# Patient Record
Sex: FEMALE | Race: WHITE
[De-identification: names, ages, dates, MRNs, and addresses within clinical notes are randomized per-mention and may not be internally consistent; named-entity substitution may affect disease eponyms.]

---

## 2018-02-01 ENCOUNTER — HOSPITAL ENCOUNTER (INPATIENT)
Dept: HOSPITAL 96 - M.ERS | Age: 68
LOS: 2 days | Discharge: HOME HEALTH | DRG: 286 | End: 2018-02-03
Attending: INTERNAL MEDICINE | Admitting: INTERNAL MEDICINE
Payer: COMMERCIAL

## 2018-02-01 VITALS — HEIGHT: 59.02 IN | BODY MASS INDEX: 16.33 KG/M2 | WEIGHT: 81 LBS

## 2018-02-01 VITALS — DIASTOLIC BLOOD PRESSURE: 89 MMHG | SYSTOLIC BLOOD PRESSURE: 125 MMHG

## 2018-02-01 VITALS — DIASTOLIC BLOOD PRESSURE: 69 MMHG | SYSTOLIC BLOOD PRESSURE: 126 MMHG

## 2018-02-01 VITALS — DIASTOLIC BLOOD PRESSURE: 81 MMHG | SYSTOLIC BLOOD PRESSURE: 128 MMHG

## 2018-02-01 VITALS — DIASTOLIC BLOOD PRESSURE: 84 MMHG | SYSTOLIC BLOOD PRESSURE: 138 MMHG

## 2018-02-01 VITALS — DIASTOLIC BLOOD PRESSURE: 90 MMHG | SYSTOLIC BLOOD PRESSURE: 148 MMHG

## 2018-02-01 VITALS — DIASTOLIC BLOOD PRESSURE: 77 MMHG | SYSTOLIC BLOOD PRESSURE: 134 MMHG

## 2018-02-01 DIAGNOSIS — E87.2: ICD-10-CM

## 2018-02-01 DIAGNOSIS — I50.43: Primary | ICD-10-CM

## 2018-02-01 DIAGNOSIS — F17.210: ICD-10-CM

## 2018-02-01 DIAGNOSIS — M47.895: ICD-10-CM

## 2018-02-01 DIAGNOSIS — J96.01: ICD-10-CM

## 2018-02-01 DIAGNOSIS — R65.10: ICD-10-CM

## 2018-02-01 DIAGNOSIS — I25.10: ICD-10-CM

## 2018-02-01 DIAGNOSIS — M40.295: ICD-10-CM

## 2018-02-01 DIAGNOSIS — R59.9: ICD-10-CM

## 2018-02-01 DIAGNOSIS — I11.0: ICD-10-CM

## 2018-02-01 DIAGNOSIS — J44.1: ICD-10-CM

## 2018-02-01 DIAGNOSIS — E11.65: ICD-10-CM

## 2018-02-01 DIAGNOSIS — R64: ICD-10-CM

## 2018-02-01 DIAGNOSIS — J96.02: ICD-10-CM

## 2018-02-01 LAB
ABSOLUTE EOSINOPHILS: 0.1 THOU/UL (ref 0–0.7)
ALBUMIN SERPL-MCNC: 3 G/DL (ref 3.4–5)
ALP SERPL-CCNC: 88 U/L (ref 46–116)
ALT SERPL-CCNC: 54 U/L (ref 30–65)
ANION GAP SERPL CALC-SCNC: 12 MMOL/L (ref 7–16)
ANION GAP SERPL CALC-SCNC: 8 MMOL/L (ref 7–16)
APTT BLD: 24.2 SECONDS (ref 25–31.3)
AST SERPL-CCNC: 63 U/L (ref 15–37)
BE: -7.5 MMOL/L
BILIRUB SERPL-MCNC: 0.7 MG/DL
BUN SERPL-MCNC: 11 MG/DL (ref 7–18)
BUN SERPL-MCNC: 12 MG/DL (ref 7–18)
CALCIUM SERPL-MCNC: 7.8 MG/DL (ref 8.5–10.1)
CALCIUM SERPL-MCNC: 8.1 MG/DL (ref 8.5–10.1)
CHLORIDE SERPL-SCNC: 103 MMOL/L (ref 98–107)
CHLORIDE SERPL-SCNC: 108 MMOL/L (ref 98–107)
CO2 SERPL-SCNC: 26 MMOL/L (ref 21–32)
CO2 SERPL-SCNC: 26 MMOL/L (ref 21–32)
CREAT SERPL-MCNC: 0.8 MG/DL (ref 0.6–1.3)
CREAT SERPL-MCNC: 0.8 MG/DL (ref 0.6–1.3)
EOSINOPHIL NFR BLD: 1 %
GLUCOSE SERPL-MCNC: 165 MG/DL (ref 70–99)
GLUCOSE SERPL-MCNC: 240 MG/DL (ref 70–99)
GRANULOCYTES NFR BLD MANUAL: 91 %
HCO3 BLD-SCNC: 19.3 MMOL/L (ref 22–26)
HCT VFR BLD CALC: 45.7 % (ref 37–47)
HGB BLD-MCNC: 15.2 GM/DL (ref 12–15)
INR PPP: 1.1
LYMPHOCYTES # BLD: 0.8 THOU/UL (ref 0.8–5.3)
LYMPHOCYTES NFR BLD AUTO: 6 %
MCH RBC QN AUTO: 32.7 PG (ref 26–34)
MCHC RBC AUTO-ENTMCNC: 33.3 G/DL (ref 28–37)
MCV RBC: 98.3 FL (ref 80–100)
MPV: 8.3 FL. (ref 7.2–11.1)
NEUTROPHILS # BLD: 12.6 THOU/UL (ref 1.6–8.1)
NEUTS BAND NFR BLD: 2 %
NT-PRO BRAIN NAT PEPTIDE: 7240 PG/ML (ref ?–300)
NUCLEATED RBCS: 0 /100WBC
PCO2 BLD: 43.8 MMHG (ref 35–45)
PLATELET # BLD EST: ADEQUATE 10*3/UL
PLATELET COUNT*: 344 THOU/UL (ref 150–400)
PO2 BLD: 90.7 MMHG (ref 75–100)
POTASSIUM SERPL-SCNC: 3.3 MMOL/L (ref 3.5–5.1)
POTASSIUM SERPL-SCNC: 3.7 MMOL/L (ref 3.5–5.1)
PROT SERPL-MCNC: 5.6 G/DL (ref 6.4–8.2)
PROTHROMBIN TIME: 10.4 SECONDS (ref 9.2–11.5)
RBC # BLD AUTO: 4.65 MIL/UL (ref 4.2–5)
RBC MORPH BLD: NORMAL
RDW-CV: 12.8 % (ref 10.5–14.5)
SODIUM SERPL-SCNC: 141 MMOL/L (ref 136–145)
SODIUM SERPL-SCNC: 142 MMOL/L (ref 136–145)
TROPONIN-I LEVEL: 0.07 NG/ML (ref ?–0.06)
TROPONIN-I LEVEL: 0.5 NG/ML (ref ?–0.06)
WBC # BLD AUTO: 13.6 THOU/UL (ref 4–11)

## 2018-02-01 NOTE — EKG
[place] [address]
Phone:  [phone]                     ELECTROCARDIOGRAM REPORT      
_______________________________________________________________________________
 
Name:       CHANTELLE HAMILTONOKSANA GUTIERREZ                Room:           [address]    ADM [place]  
[facility]#:  [id number]      Account #:      [id number]  
Admission:  18     Attend Phys:    YASMIN Soto
Discharge:               Date of Birth:  [birth year]  
         Report #: [id number]
    [id number]
_______________________________________________________________________________
THIS REPORT FOR:  //name//                      
 
                         [facility] ED
                                       
Test Date:    2018               Test Time:    06:50:26
Pat Name:     NIGHAT HAMILTON            Department:   
Patient ID:   [id number]            Room:         [facility]
Gender:       F                        Technician:   TIFFANIE
:          1950               Requested By: Agusto Andrew
Order Number: [id number] MD:   Cecil Sanchez
                                 Measurements
Intervals                              Axis          
Rate:         122                      P:            0
PA:           105                      QRS:          -75
QRSD:         125                      T:            102
QT:           347                                    
QTc:          495                                    
                           Interpretive Statements
Sinus tachycardia
left axis
LBBB
Artifact in lead(s) I,III,aVR,aVL,aVF
No previous ECG available for comparison
 
Electronically Signed On 2018 14:44:27 CST by Cecil Sanchez
[email or web address]
 
 
 
 
 
 
 
 
 
 
 
 
 
 
 
 
 
  <ELECTRONICALLY SIGNED>
                                           By: Cecil Sanchez MD, [facility]      
  18     1444
D: 18 0650   _____________________________________
T: 18 0650   Cecil Sanchez MD, [facility]        /EPI

## 2018-02-01 NOTE — NUR
ASSUMED PT CARE FROM SARABJIT AT APPROX 1200. PT A/O X4, DENIES PAIN. PT SPOKE TO
GABE CARTER AND DR TATUM, PT TO HAVE A CARDIAC CATH TOMORROW. PT ANXIOUS,
QUESTIONS ANSWERED. PT EDUCATED ON THE PROCEDURE. PT USES CALL LIGHT
APPRORIALTY. REPORT GIVEN TO MARTÍNEZ RHODES

## 2018-02-01 NOTE — 2DMMODE
[place] [address]
Phone:  [phone] 2 D/M-MODE ECHOCARDIOGRAM     
_______________________________________________________________________________
 
Name:         NIGHAT HAMILTON               Room:          [address] [place] 
[facility]#:    [id number]     Account #:     [id number]  
Admission:    18    Attend Phys:   Giorgi Brown
Discharge:                Date of Birth: [birth year]  
Date of Service: 18 1645  Report #:      [id number]
        [id number]
_______________________________________________________________________________
THIS REPORT FOR:  //name//                      
 
 
--------------- APPROVED REPORT --------------
 
 
Study performed:  2018 14:50:34
 
EXAM: Comprehensive 2D, Doppler, and color-flow 
Echocardiogram 
Patient Location: In-Patient   
Room #:  201     Status:  routine
 
      BSA:         1.80
HR: 64 bpm BP:          125/89 mmHg 
Rhythm: NSR     
 
Other Information 
Technically limited study due to  subcostal views only, no rib space, 
89 lbs.
 
Indications
Dyspnea 
Elevated Troponin
 
2D Dimensions
   LVEF(%):  27.30 (&gt;50%)
IVSd:  6.59 (7-11mm) LVOT Diam:  17.14 (18-24mm) 
LVDd:  47.66 mm  
PWd:  8.28 (7-11mm) Ascending Ao:  30.45 (22-36mm)
LVDs:  41.62 (25-40mm) 
Aortic Root:  28.81 mm 
   Zheng's LVEF:  27.30 %
 
Aortic Valve
AoV Peak Timo.:  1.04 m/s 
AO Peak Gr.:  4.32 mmHg  LVOT Max P.96 mmHg
AO Mean Gr.:  2.57 mmHg  LVOT Mean P.41 mmHg
    LVOT Max V:  0.70 m/s
AO V2 VTI:  15.59 cm  LVOT Mean V:  0.58 m/s
AGUSTO (VTI):  2.19 cm2  LVOT V1 VTI:  14.77 cm
 
Mitral Valve
    E/A Ratio:  1.85
    MV Decel. Time:  122.32 ms
MV E Max Timo.:  0.73 m/s 
 
 
[place] [address]
Phone:  [phone]                     2 D/M-MODE ECHOCARDIOGRAM     
_______________________________________________________________________________
 
Name:         NIGHAT HAMILTON               Room:          [address] [place] 
.R.#:    [id number]     Account #:     [id number]  
Admission:    18    Attend Phys:   Giorgi Brown
Discharge:                Date of Birth: [birth year]  
Date of Service: 18 1645  Report #:      [id number]
        [id number]
_______________________________________________________________________________
MV PHT:  35.47 ms  
MVA (PHT):  6.20 cm2  
 
Pulmonary Valve
PV Peak Timo.:  0.97 m/s PV Peak Gr.:  3.73 mmHg
 
Left Ventricle
The left ventricle is normal size. There is severe global hypokinesis 
that appears more pronounced in the anterior wall and septum. There 
is normal left ventricular wall thickness. Left ventricular systolic 
function is severely decreased. LVEF is 30-35%. This study is not 
technically sufficient to allow evaluation of the LV diastolic 
function.
 
Right Ventricle
The right ventricle is normal size. The right ventricular systolic 
function is normal.
 
Atria
The left atrium size is normal. The right atrium size is 
normal.
 
Aortic Valve
The aortic valve is normal in structure. Mild aortic regurgitation. 
There is no aortic valvular stenosis.
 
Mitral Valve
The mitral valve is normal in structure. Mild mitral regurgitation. 
No evidence of mitral valve stenosis.
 
Tricuspid Valve
The tricuspid valve is normal in structure. Unable to assess PA 
pressure. Trace tricuspid regurgitation.
 
Pulmonic Valve
The pulmonary valve is normal in structure. There is no pulmonic 
valvular regurgitation.
 
Great Vessels
The aortic root is normal in size. IVC is normal in size and 
collapses with &gt;50% inspiration
 
Pericardium
There is trace pericardial effusion without evidence of 
tamponade.
 
 
 
[place] [address]
Phone:  [phone]                     2 D/M-MODE ECHOCARDIOGRAM     
_______________________________________________________________________________
 
Name:         NIGHAT HAMILTON               Room:          [address] [place] 
[facility]#:    [id number]     Account #:     [id number]  
Admission:    18    Attend Phys:   Giorgi Brown
Discharge:                Date of Birth: [birth year]  
Date of Service: 18 1645  Report #:      [id number]
        [id number]
_______________________________________________________________________________
&lt;Conclusion&gt;
The left ventricle is normal size.
There is normal left ventricular wall thickness.
Left ventricular systolic function is severely decreased.
LVEF is 30-35%.
There is severe global hypokinesis that appears more pronounced in 
the anterior wall and septum.
Mild mitral regurgitation.
Mild aortic regurgitation.
There is trace pericardial effusion without evidence of tamponade.
 
 
 
 
 
 
 
 
 
 
 
 
 
 
 
 
 
 
 
 
 
 
 
 
 
 
 
 
 
 
 
 
 
 
  <ELECTRONICALLY SIGNED>
                                           By: Savage Stearns MD, FACC   
  18     1645
D: 18   _____________________________________
T: 18   Savage Stearns MD, FACC     /INF

## 2018-02-02 VITALS — SYSTOLIC BLOOD PRESSURE: 106 MMHG | DIASTOLIC BLOOD PRESSURE: 68 MMHG

## 2018-02-02 VITALS — SYSTOLIC BLOOD PRESSURE: 121 MMHG | DIASTOLIC BLOOD PRESSURE: 77 MMHG

## 2018-02-02 VITALS — SYSTOLIC BLOOD PRESSURE: 108 MMHG | DIASTOLIC BLOOD PRESSURE: 62 MMHG

## 2018-02-02 VITALS — DIASTOLIC BLOOD PRESSURE: 74 MMHG | SYSTOLIC BLOOD PRESSURE: 115 MMHG

## 2018-02-02 VITALS — DIASTOLIC BLOOD PRESSURE: 62 MMHG | SYSTOLIC BLOOD PRESSURE: 96 MMHG

## 2018-02-02 VITALS — SYSTOLIC BLOOD PRESSURE: 117 MMHG | DIASTOLIC BLOOD PRESSURE: 78 MMHG

## 2018-02-02 VITALS — DIASTOLIC BLOOD PRESSURE: 72 MMHG | SYSTOLIC BLOOD PRESSURE: 114 MMHG

## 2018-02-02 VITALS — SYSTOLIC BLOOD PRESSURE: 110 MMHG | DIASTOLIC BLOOD PRESSURE: 61 MMHG

## 2018-02-02 VITALS — SYSTOLIC BLOOD PRESSURE: 112 MMHG | DIASTOLIC BLOOD PRESSURE: 71 MMHG

## 2018-02-02 VITALS — DIASTOLIC BLOOD PRESSURE: 74 MMHG | SYSTOLIC BLOOD PRESSURE: 111 MMHG

## 2018-02-02 VITALS — DIASTOLIC BLOOD PRESSURE: 74 MMHG | SYSTOLIC BLOOD PRESSURE: 110 MMHG

## 2018-02-02 LAB
ALBUMIN SERPL-MCNC: 3.1 G/DL (ref 3.4–5)
ALP SERPL-CCNC: 77 U/L (ref 46–116)
ALT SERPL-CCNC: 45 U/L (ref 30–65)
ANION GAP SERPL CALC-SCNC: 10 MMOL/L (ref 7–16)
AST SERPL-CCNC: 36 U/L (ref 15–37)
BE: 0.1 MMOL/L
BILIRUB SERPL-MCNC: 0.4 MG/DL
BUN SERPL-MCNC: 14 MG/DL (ref 7–18)
CALCIUM SERPL-MCNC: 8.2 MG/DL (ref 8.5–10.1)
CHLORIDE SERPL-SCNC: 106 MMOL/L (ref 98–107)
CHOLEST SERPL-MCNC: 154 MG/DL (ref ?–200)
CO2 SERPL-SCNC: 28 MMOL/L (ref 21–32)
CREAT SERPL-MCNC: 0.8 MG/DL (ref 0.6–1.3)
EGFR IF AFRICAN AMERICAN: 105 (ref 59–?)
GLUCOSE SERPL-MCNC: 129 MG/DL (ref 70–99)
HCO3 BLD-SCNC: 23.5 MMOL/L (ref 22–26)
HCT VFR BLD CALC: 42.5 % (ref 37–47)
HDLC SERPL-MCNC: 49 MG/DL (ref 40–?)
HGB BLD-MCNC: 14.3 GM/DL (ref 12–15)
LDLC SERPL-MCNC: 96 MG/DL (ref ?–100)
MAGNESIUM SERPL-MCNC: 2 MG/DL (ref 1.8–2.4)
MCH RBC QN AUTO: 33 PG (ref 26–34)
MCHC RBC AUTO-ENTMCNC: 33.7 G/DL (ref 28–37)
MCV RBC: 97.9 FL (ref 80–100)
MPV: 8.5 FL. (ref 7.2–11.1)
PCO2 BLD: 34.7 MMHG (ref 35–45)
PHOSPHATE SERPL-MCNC: 4.2 MG/DL (ref 2.5–4.9)
PLATELET COUNT*: 318 THOU/UL (ref 150–400)
PO2 BLD: 99.3 MMHG (ref 75–100)
POTASSIUM SERPL-SCNC: 4.8 MMOL/L (ref 3.5–5.1)
PROT SERPL-MCNC: 5.9 G/DL (ref 6.4–8.2)
PTH-INTACT SERPL-MCNC: 66 PG/ML (ref 15–65)
RBC # BLD AUTO: 4.34 MIL/UL (ref 4.2–5)
RDW-CV: 12.7 % (ref 10.5–14.5)
SERUM ASSESSMENT: CLEAR
SODIUM SERPL-SCNC: 144 MMOL/L (ref 136–145)
TC:HDL: 3.1 RATIO
TRIGL SERPL-MCNC: 49 MG/DL (ref ?–150)
VLDLC SERPL CALC-MCNC: 10 MG/DL (ref ?–40)
WBC # BLD AUTO: 8.4 THOU/UL (ref 4–11)

## 2018-02-02 NOTE — NUR
PT NPO FOR CARDIAC CATH. IS [place]. PT TAKEN OUT OF BED, BES WAS ZERO OUT, PT
REWIGHTED, SHOWS 37 KG, CONSENT SIGNED AND IN CHART

## 2018-02-02 NOTE — CON
[address]  [address]                    CONSULTATION                  
_______________________________________________________________________________
 
Name:       NIGHAT HAMILTON                Room:           [address] [place]  
M.R.#:  [id number]      Account #:      [id number]  
Admission:  02/01/18     Attend Phys:    YASMIN Soto
Discharge:               Date of Birth:  [birth year]  
         Report #: [id number]
                                                                     [id number]  
_______________________________________________________________________________
THIS REPORT FOR:  //name//                      
 
CC: NICOLE Gallardo DO
 
DATE OF SERVICE:  02/01/2018
 
 
ATTENDING PHYSICIAN:  Giorgi Brown DO.
 
INDICATION FOR CONSULTATION:  Dyspnea, possible COPD, mild hypoxemia.
 
CLINICAL SUMMARY:  The patient is a 67-year-old female, current smoker, about
half pack a day, who had sudden dyspnea on exertion, which woke her up this
morning.  She states that around 4:00 a.m., she thought the house was warm at 73
degrees.  She denies any chest pain, but she states she could not catch her
breath.  She waited for a few minutes, is not on any inhalers or oxygen at home,
and was seen in the Emergency Room.  Her D-dimer was mildly elevated, so they
did a CT angio of the chest, which showed no pulmonary emboli.  She had some
mild fluid overload with some small bilateral pleural effusions and one
subcarinal lymph node of about 25-30 mm and it was 10 mm in length.  She denies
having any prior chest x-rays.  She is a smoker about a half pack a day.  Denies
any fever, chills, sweats, cough, sick or ill contacts.  She states she had her
flu shot this fall.  She does work for [employer] and she states she
is around several people at a time.
 
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
in an iron lung in [place], [place].  She does not know whether it is for 6
months or a year.  States she was not on oxygen at home.  Her right leg has been
somewhat shortened and weaker.  She has some atrophy of her right leg.  She has
had 3 surgeries for scoliosis, mostly at lumbar and thoracic spine.  She states
she does not think she is getting any shorter at least at this time.  Has never
been on inhalers, never been on oxygen at home.  She has recently had some
elevated blood sugars.  She is not certain what that is about.  She was not on
any insulin at home.  She does have a history of mild hypertension and again,
polio as noted before.
 
PAST SURGICAL HISTORY:  Includes back surgery x 3, mostly in the remote past.
 
MEDICATIONS:  Again, no home medications.  Currently in the hospital, she is on
oxygen at 2 liters and she is on ceftriaxone 1 g daily, Solu-Medrol 62.5 mg q. 8
hours, and oxygen at 2 liters.
 
FAMILY HISTORY:  Negative for premature cardiopulmonary disease.
 
 
 
[place] [address]                    CONSULTATION                  
_______________________________________________________________________________
 
Name:       NIGHAT HAMILTON BRENDA                Room:           [address] [place]  
[facility].#:  [id number]      Account #:      [id number]  
Admission:  02/01/18     Attend Phys:    YASMIN Soto
Discharge:               Date of Birth:  [birth year]  
         Report #: [id number]
                                                                     [id number]  
_______________________________________________________________________________
 
SOCIAL HISTORY:  Lives at home.  She has one daughter who lives in the area,
seems concerned about her care.  The patient smokes a half pack a day, has a 30
or 40 pack year history of smoking.  Denies any alcohol or illicit drug use.  No
history of recent travel or sick or ill contacts.
 
REVIEW OF SYSTEMS:  A 14-point review of systems reviewed and negative except
for pertinent positives noted in HPI.
 
PHYSICAL EXAMINATION:
GENERAL:  Thin, frail, 67-year-old female, in no acute distress.  She can talk
to me in sentences.
VITAL SIGNS:  Her blood pressure is 125/88.  Her heart rate is 106 at rest. 
Respirations are 16, saturations on 2 liters and her saturation is 95%.  Her
temperature is 35.4 degrees.  She is 4 feet 10 inches tall.  Her weight was
reported at 86 pounds or approximately 39 kg.  Her BMI appears to be about 16.
HEENT:  Unremarkable except for muscle wasting.
NECK:  Shows significant loss of musculature.  Her sternocleidomastoid muscles
are not hypertrophied.  There is no cervical or supraclavicular adenopathy. 
Supraclavicular fossa is quite thin.
CHEST:  Shows diminished breath sounds, but without wheeze.  No prolonged
expiratory phase.  She has a weak cough reflex.  Her intercostal ribs are seen
throughout.  Postoperative scoliosis changes are seen thoracic and lumbar spine
posteriorly.
CARDIOVASCULAR:  Shows diminished heart tones, regular rate and rhythm.  Heart
rate is 110.  No S3 is noted.  No murmur, gallop or rub.
ABDOMEN:  Soft, thin, without masses or megaly.
EXTREMITIES:  Right leg is somewhat shorter than left leg.  Right leg is
atrophied with loss of her gastroc muscle and some flexion abnormalities.  No
DVT are noted any either leg.  Homans sign is negative.  No cyanosis, clubbing
or edema.
SKIN:  Dry and intact.
NEUROLOGIC:  She has muscle weakness in her diaphragms, abdomen and also in her
leg strength.
 
LABORATORY DATA:  Hemoglobin 15, white count is 13,000, platelets are 344,000. 
Sodium is 142, potassium is 3.7, chloride is 108, BUN is 12, creatinine 0.8 and
glucose was 240 and then 260, carbon dioxide level is 26.  ABGs on 4 liters
showed a pO2 of 90, a pH of 7.26, pCO2 is 44, bicarb is 19, sats 95%.  Chest
x-ray and then CT angio of the chest did not show any pulmonary emboli.  She has
marked kyphoscoliosis and on CT angio of the chest, there are small pleural
effusions about 2 cm each, not enough to be tapped and one subcarinal lymph
node, which is somewhat ill-defined, either 25-30 mm in diameter.  Lung fields
otherwise were fairly clear.  Some peribronchial cuffing was noted.
 
IMPRESSION:
 
 
 
[address], [place] [address]                    CONSULTATION                  
_______________________________________________________________________________
 
Name:       JOYNIGHAT BRENDA                Room:           [address] [place]  
[facility]#:  [id number]      Account #:      [id number]  
Admission:  02/01/18     Attend Phys:    YASMIN Soto
Discharge:               Date of Birth:  [birth year]  
         Report #: [id number]
                                                                     [id number]  
_______________________________________________________________________________
1.  Dyspnea, multifactorial, most likely related to restrictive lung disease,
kyphoscoliosis and postpolio syndrome.
2.  Cachexia and weight loss.
3.  Subcarinal adenopathy, etiology unclear.
 
PLAN:  Would continue breathing treatments and steroids, work on incentive
spirometry, somewhat poor prognosis is noted with people of postpolio syndrome. 
She does not have significant hypercarbia at least at this time, but she may
have some pulmonary artery hypertension.  Await and see what Cardiology thinks
and see if they do an echo and see if she has pulmonary artery enlargement and
pulmonary artery hypertension.  We will try Lasix and oxygen at this time.  She
may need room air exercise oximetry before she is discharged to see if she will
need supplemental oxygen at home.  Full PFTs as an outpatient in 6-8 weeks would
be helpful to see if this is obstructive or restrictive defect.  Occasionally,
BiPAP at night will be helpful if she truly has postpolio syndrome and she may
need a sleep study at some point in time.
 
Thanks again for allowing us to participate in this lady's care.  Overall
prognosis is somewhat guarded, but we will see what we can do the meantime to
improve her clinical situation.
 
 
 
 
 
 
 
 
 
 
 
 
 
 
 
 
 
 
 
 
 
 
 
 
<ELECTRONICALLY SIGNED>
                                        By:  Rangel Pineda MD         
02/02/18     0809
D: 02/01/18 1213_______________________________________
T: 02/01/18 2333Anthoclaire Pineda MD            /nt

## 2018-02-02 NOTE — NUR
PT RESTING IN BED, APPEARS ALERT O X 4, DENIES CHEST PAIN, SOB, PAIN OR
DISCOMFORT, IVHF INFUSING AT 75 ML PER HOUR , ORDER TO D/C IVF AT 2200. [facility]TACT
PROGRESSIBFG TOWARDS GOAL

## 2018-02-02 NOTE — NUR
PT TFO FLOOE FROM CATH LAB, APPEARS ALERT O X 4, DENIES CHEST PAIN, SOB,
RAD-STAT DEVICE TO R WRIST,INFLATED, R HAND APPEARS WARM, DENIES ANY NIMBNESS
OR TINGLING TO R UPPER EXT, ON O2 AT 2L PER NC. IVD CONT AT 1/2 NS AT 75 ML
PER HOUR

## 2018-02-02 NOTE — NUR
CM ASSESSMENT:
Pt is A&O. Resides at home. Independent with ADLs, continues to work. No DME.
No hx of HH or SNF. CM provided with DPOA paperwork. Supportive family that is
involved in POC. Goal [place] at [place]. No needs anticipated.
Following.

## 2018-02-02 NOTE — NUR
PT  RESTING IN BED, APPEARS ALERT O X 4, DENIES CHEST PAIN, SON, PAIN OR
DISCOMFORT, PT IS NPO FPR CARDIAC CATTH, SL

## 2018-02-02 NOTE — NUR
ASSUMED CARE AROUND 1930. PT A/OX4, APPEARED TO REST WELL TONIGHT. PT CONFUSED
AROUND 2400 STATING SHE DIDN'T KNOW WHERE SHE WAS. PT WASN'T ABLE TO TELL ME
WHAT PROCEDURE SHE WAS HAVING TODAY, LATER SHE PIECED IT TOGETHER. PT SEEMS TO
USE HUMOR TO MASK DISORIENTATION. TELE MONITOR TRACING SR/ST/BBB. ON 2L NC.
IV'S SALINE LOCKED. UP SBA TO BSC. DENIED ANY PAIN. NPO SINCE MIDNIGHT FOR
CARDIAC CATH TODAY. VSS. BEDALARM IN PLACE. CALL LIGHT IN REACH, WILL CONTINUE
WITH PLAN OF CARE.

## 2018-02-03 VITALS — SYSTOLIC BLOOD PRESSURE: 97 MMHG | DIASTOLIC BLOOD PRESSURE: 61 MMHG

## 2018-02-03 VITALS — DIASTOLIC BLOOD PRESSURE: 75 MMHG | SYSTOLIC BLOOD PRESSURE: 110 MMHG

## 2018-02-03 VITALS — DIASTOLIC BLOOD PRESSURE: 76 MMHG | SYSTOLIC BLOOD PRESSURE: 114 MMHG

## 2018-02-03 VITALS — DIASTOLIC BLOOD PRESSURE: 70 MMHG | SYSTOLIC BLOOD PRESSURE: 106 MMHG

## 2018-02-03 LAB
ABSOLUTE BASOPHILS: 0 THOU/UL (ref 0–0.2)
ABSOLUTE EOSINOPHILS: 0 THOU/UL (ref 0–0.7)
ABSOLUTE MONOCYTES: 0.3 THOU/UL (ref 0–1.2)
ANION GAP SERPL CALC-SCNC: 7 MMOL/L (ref 7–16)
BASOPHILS NFR BLD AUTO: 0.2 %
BUN SERPL-MCNC: 25 MG/DL (ref 7–18)
CALCIUM SERPL-MCNC: 8.3 MG/DL (ref 8.5–10.1)
CHLORIDE SERPL-SCNC: 104 MMOL/L (ref 98–107)
CO2 SERPL-SCNC: 29 MMOL/L (ref 21–32)
CREAT SERPL-MCNC: 0.6 MG/DL (ref 0.6–1.3)
EOSINOPHIL NFR BLD: 0 %
EST. AVERAGE GLUCOSE BLD GHB EST-MCNC: 105 MG/DL
GLUCOSE SERPL-MCNC: 130 MG/DL (ref 70–99)
GLYCOHEMOGLOBIN (HGB A1C): 5.3 % (ref 4.8–5.6)
GRANULOCYTES NFR BLD MANUAL: 92.6 %
HCT VFR BLD CALC: 42.8 % (ref 37–47)
HGB BLD-MCNC: 14.2 GM/DL (ref 12–15)
LYMPHOCYTES # BLD: 0.6 THOU/UL (ref 0.8–5.3)
LYMPHOCYTES NFR BLD AUTO: 5 %
MCH RBC QN AUTO: 32.4 PG (ref 26–34)
MCHC RBC AUTO-ENTMCNC: 33.2 G/DL (ref 28–37)
MCV RBC: 97.4 FL (ref 80–100)
MONOCYTES NFR BLD: 2.2 %
MPV: 7.9 FL. (ref 7.2–11.1)
NEUTROPHILS # BLD: 10.7 THOU/UL (ref 1.6–8.1)
NUCLEATED RBCS: 0 /100WBC
PLATELET COUNT*: 313 THOU/UL (ref 150–400)
POTASSIUM SERPL-SCNC: 4 MMOL/L (ref 3.5–5.1)
RBC # BLD AUTO: 4.4 MIL/UL (ref 4.2–5)
RDW-CV: 13 % (ref 10.5–14.5)
SODIUM SERPL-SCNC: 140 MMOL/L (ref 136–145)
WBC # BLD AUTO: 11.6 THOU/UL (ref 4–11)

## 2018-02-03 NOTE — NUR
PT RESTING IN BED, APPEARS ALERT O X 4, DENIES CHEST PAIN, SOB, PAIN OR
DISCOMFORT, R RADIAL CATH SITE APPEARS SOFT IUNTACT WITHOUT EVIDENCE OF
BLEEDING

## 2018-02-03 NOTE — CARD
[facility] 
[address] [place]  [address]                    CARDIAC CATH REPORT           
_______________________________________________________________________________
 
Name:       NIGHAT HAMILTON BRENDA                Room:           [address] [place]  
..#:  [id number]      Account #:      [id number]  
Admission:  02/01/18     Attend Phys:    YASMIN Soto
Discharge:               Date of Birth:  [birth year]  
         Report #: [id number]
                                                                     [id number]
_______________________________________________________________________________
THIS REPORT FOR:  //name//                      
 
 
--------------- APPROVED REPORT --------------
 
 
Patient Details
Patient Status: In-Patient                  Room #: 201
The patient is a 67 year-old female
 
Event Personnel
Cecil Sanchez  Cardiologist, Ava Chapa RN Circulator, Rangel Glez (R) Monitor, Jada Claudio  Scrub
 
Procedures Performed
Left Heart Cath w/or w/o 
Coronaries
 
Indication
Cardiomyopathy
 
Admission/Lab Medications/Medications given during procedure
Heparin Unfract.
 
Procedure Narrative
The patient was brought electively to the Cardiac Catheterization 
Laboratory and was prepped and draped in a sterile manner. The right 
wrist was infiltrated with 1% Lidocaine subcutaneous anesthesia. A 
Slender Glidesheath sheath was inserted into the Right Radial Artery. 
Coronary angiography was performed using coronary diagnostic 
catheters. The right coronary system was accessed and visualized with 
a Diagnostic JR4 catheter. The left coronary system was accessed and 
visualized with a Diagnostic JL 3.5 catheter. The left ventricle was 
accessed and visualized with a Diagnostic Straight Pig catheter. Left 
ventricular/Aortic Valve gradient assessed via catheter pullback. 
Left ventriculogram was performed in DUNN projection. Closure device 
was deployed with a Fr Reg Vascband. There was no hematoma.
 
Fluoro Time:    2.6 minutes    
Dose:     DAP 69772 cGycm2  175 mGy  
Contrast Type and Amount:  Omnipaque 40 ml    
 
Coronary Angiography
The patient's coronary anatomy is right dominant. 
 
Diagnostic Cath
 
 
 
[place] [address]                    CARDIAC CATH REPORT           
_______________________________________________________________________________
 
Name:       LOWCONIGHAT BRENDA                Room:           [address] [place]  
[facility]#:  [id number]      Account #:      [id number]  
Admission:  02/01/18     Attend Phys:    YASMIN Soto
Discharge:               Date of Birth:  [birth year]  
         Report #: [id number]
                                                                     [id number]
_______________________________________________________________________________
Left Main 0% stenosis
LAD  40% proximal and 50% mid stenosis
Circumflex 0% stenosis
Right Coronary 40% proximal and 50% mid stenosis
Ramus  80% proximal stenosis
 
Left Ventriculography
The left ventricle is mildly dilated in size with contractility. The 
left ventricular ejection fraction is estimated to be 25-30%. There 
is 1+ mitral insufficiency.
 
Hemodynamics
The aortic pressure is 107/64 mmHg with a mean of 76 mmHg. The left 
ventricular pressure is 100/4 mmHg with a mean of mmHg. The left 
ventricular end diastolic pressure is 20 mmHg. There was no gradient 
across the aortic valve upon pullback. Pullback from the left 
ventricle to the aorta revealed no gradient across the aortic 
valve.
 
Conclusion
1.  moderate CAD
2.  dilated cardiomyopathy 
 
Recommendations
Cardiac Rehabilitation Referral
Aggressive Medical Therapy
 
 
 
 
 
 
 
 
 
 
 
 
 
 
 
 
 
 
<ELECTRONICALLY SIGNED>
                                        By:  Cecil Sanchez MD, FACC      
02/03/18     1034
D: 02/03/18 1034_______________________________________
T: 02/03/18 1034David KHOA Sanchez MD, [facility]         /INF

## 2018-02-03 NOTE — NUR
ASSUMED CARE AROUND 1930. PT A/OX4 AND PLEASANT, FORGETFUL AT TIMES. RESTED
MOST THE NIGHT. TELE MONITOR TRACING SR/ST/BBB WITH HR UP 'S. ON ROOM
AIR. UP SBA TO BR. IV SALINE LOCKED AT THIS TIME. RIGHT RADIAL CATH SITE SOFT,
NO BLEEDING OR HEMATOMA NOTED. VSS, AFEBRILE. PT DENIES ANY PAIN, NO NEW
CONCERNS VOICED TONIGHT. CALL LIGHT IN REACH, BEDALARM ON, WILL CONTINUE WITH
PLAN OF ARE.

## 2018-02-14 ENCOUNTER — HOSPITAL ENCOUNTER (OUTPATIENT)
Dept: HOSPITAL 96 - M.CL | Age: 68
Setting detail: OBSERVATION
LOS: 1 days | Discharge: HOME | End: 2018-02-15
Attending: INTERNAL MEDICINE | Admitting: INTERNAL MEDICINE
Payer: COMMERCIAL

## 2018-02-14 VITALS — SYSTOLIC BLOOD PRESSURE: 98 MMHG | DIASTOLIC BLOOD PRESSURE: 52 MMHG

## 2018-02-14 VITALS — SYSTOLIC BLOOD PRESSURE: 101 MMHG | DIASTOLIC BLOOD PRESSURE: 53 MMHG

## 2018-02-14 VITALS — DIASTOLIC BLOOD PRESSURE: 62 MMHG | SYSTOLIC BLOOD PRESSURE: 107 MMHG

## 2018-02-14 VITALS — SYSTOLIC BLOOD PRESSURE: 100 MMHG | DIASTOLIC BLOOD PRESSURE: 60 MMHG

## 2018-02-14 VITALS — SYSTOLIC BLOOD PRESSURE: 109 MMHG | DIASTOLIC BLOOD PRESSURE: 58 MMHG

## 2018-02-14 VITALS — HEIGHT: 59 IN | WEIGHT: 82 LBS | BODY MASS INDEX: 16.53 KG/M2

## 2018-02-14 VITALS — SYSTOLIC BLOOD PRESSURE: 104 MMHG | DIASTOLIC BLOOD PRESSURE: 61 MMHG

## 2018-02-14 VITALS — DIASTOLIC BLOOD PRESSURE: 52 MMHG | SYSTOLIC BLOOD PRESSURE: 104 MMHG

## 2018-02-14 VITALS — DIASTOLIC BLOOD PRESSURE: 49 MMHG | SYSTOLIC BLOOD PRESSURE: 112 MMHG

## 2018-02-14 VITALS — DIASTOLIC BLOOD PRESSURE: 62 MMHG | SYSTOLIC BLOOD PRESSURE: 102 MMHG

## 2018-02-14 DIAGNOSIS — I10: ICD-10-CM

## 2018-02-14 DIAGNOSIS — J44.9: ICD-10-CM

## 2018-02-14 DIAGNOSIS — Z98.61: ICD-10-CM

## 2018-02-14 DIAGNOSIS — I25.5: ICD-10-CM

## 2018-02-14 DIAGNOSIS — I25.110: Primary | ICD-10-CM

## 2018-02-14 DIAGNOSIS — F17.200: ICD-10-CM

## 2018-02-14 LAB
ALBUMIN SERPL-MCNC: 3.1 G/DL (ref 3.4–5)
ALP SERPL-CCNC: 60 U/L (ref 46–116)
ALT SERPL-CCNC: 38 U/L (ref 30–65)
ANION GAP SERPL CALC-SCNC: 6 MMOL/L (ref 7–16)
AST SERPL-CCNC: 25 U/L (ref 15–37)
BILIRUB SERPL-MCNC: 0.7 MG/DL
BUN SERPL-MCNC: 15 MG/DL (ref 7–18)
CALCIUM SERPL-MCNC: 8.2 MG/DL (ref 8.5–10.1)
CHLORIDE SERPL-SCNC: 101 MMOL/L (ref 98–107)
CHOLEST SERPL-MCNC: 206 MG/DL (ref ?–200)
CO2 SERPL-SCNC: 31 MMOL/L (ref 21–32)
CREAT SERPL-MCNC: 0.6 MG/DL (ref 0.6–1.3)
GLUCOSE SERPL-MCNC: 84 MG/DL (ref 70–99)
HCT VFR BLD CALC: 46.4 % (ref 37–47)
HDLC SERPL-MCNC: 76 MG/DL (ref 40–?)
HGB BLD-MCNC: 15.7 GM/DL (ref 12–15)
INR PPP: 1
LDLC SERPL-MCNC: 101 MG/DL (ref ?–100)
MCH RBC QN AUTO: 32.9 PG (ref 26–34)
MCHC RBC AUTO-ENTMCNC: 33.9 G/DL (ref 28–37)
MCV RBC: 97.2 FL (ref 80–100)
MPV: 7.4 FL. (ref 7.2–11.1)
PLATELET COUNT*: 354 THOU/UL (ref 150–400)
POTASSIUM SERPL-SCNC: 4.2 MMOL/L (ref 3.5–5.1)
PROT SERPL-MCNC: 5.9 G/DL (ref 6.4–8.2)
PROTHROMBIN TIME: 10 SECONDS (ref 9.2–11.5)
RBC # BLD AUTO: 4.78 MIL/UL (ref 4.2–5)
RDW-CV: 12.8 % (ref 10.5–14.5)
SODIUM SERPL-SCNC: 138 MMOL/L (ref 136–145)
TC:HDL: 2.7 RATIO
TRIGL SERPL-MCNC: 146 MG/DL (ref ?–150)
VLDLC SERPL CALC-MCNC: 29 MG/DL (ref ?–40)
WBC # BLD AUTO: 14.6 THOU/UL (ref 4–11)

## 2018-02-14 NOTE — H
[address]  [address]                    HISTORY AND PHYSICAL          
_______________________________________________________________________________
 
Name:       NIGHAT HAMILTON                Room:           [address] [place] [facility]#:  [id number]      Account #:      [id number]  
Admission:  02/14/18     Attend Phys:    Hawk Maravilla MD, 
Discharge:  02/15/18     Date of Birth:  [birth year]  
         Report #: [id number]
                                                                                
_______________________________________________________________________________
THIS REPORT FOR:  //name//                      
 
Please refer to the History and Physical performed in the physician's office.
 
 
 
 
 
 
 
 
 
 
 
 
 
 
 
 
 
 
 
 
 
 
 
 
 
 
 
 
 
 
 
 
 
 
 
 
 
 
 
 
 
                       
                                        By:                                
                 
D: 02/14/18     _______________________________________
T: 02/16/18 1540Medical Records Staff BATSHEVA       /AL

## 2018-02-14 NOTE — NUR
PT ADMITTED TO UNIT AROUND 1730 PT CAME FROM CATH LAB, PT IS ALERT AND
ORIENTED X 4 PT IS NOT A FALL RISK PT IS ON BEDREST LAYING WITH HEAD ELEVATED
AT 15 DEGREES UNTIL 2030 PT HAS RIGHT GROIN WITH MYNX CLOSURE SITE HAS MINIMAL
DRIED DRAINAGE SITE IS CLEAN INTACT SOFT PT IS SR WITH BBB ON THE MONITOR, PT
IS PLEASANT AND COOPERATIVE, WILL CONTINUE TO MONITOR

## 2018-02-14 NOTE — EKG
[place] [address]
Phone:  [phone]                     ELECTROCARDIOGRAM REPORT      
_______________________________________________________________________________
 
Name:       LOWCOCHANTELLENIGHAT L                Room:           [place] [address]    ADM [place]  
M.R.#:  [id number]      Account #:      [id number]  
Admission:  18     Attend Phys:    Hawk Maravilla MD, 
Discharge:               Date of Birth:  [birth year]  
         Report #: [id number]
    [id number]
_______________________________________________________________________________
THIS REPORT FOR:  //name//                      
 
                          [facility]
                                       
Test Date:    2018               Test Time:    14:57:59
Pat Name:     NIGHAT HAMILTON            Department:   
Patient ID:   [id number]            Room:         [facility]
Gender:       F                        Technician:   27
:          1950               Requested By: Hawk Maravilla
Order Number: [id number]    Camron MD:   Hawk Maravilla
                                 Measurements
Intervals                              Axis          
Rate:         69                       P:            75
LA:           160                      QRS:          -81
QRSD:         131                      T:            119
QT:           412                                    
QTc:          442                                    
                           Interpretive Statements
Sinus rhythm
LEFT BUNDLE BRANCH BLOCK
with LAD
Compared to ECG 2018 06:5
Sinus tachycardia no longer present
 
Electronically Signed On 2018 16:25:37 CST by Hawk Maravilla
[email or web address]
 
 
 
 
 
 
 
 
 
 
 
 
 
 
 
 
 
  <ELECTRONICALLY SIGNED>
                                           By: Hawk Maravilla MD, [facility]     
  18     1625
D: 18 1457   _____________________________________
T: 18 1457   Hawk Maravilla MD, FAC       /EPI

## 2018-02-14 NOTE — CARD
[address]  [address]                    CARDIAC CATH REPORT           
_______________________________________________________________________________
 
Name:       NIGHAT HAMILTON                Room:           [address] [place]  
.R.#:  [id number]      Account #:      [id number]  
Admission:  02/14/18     Attend Phys:    Hawk Maravilla MD, 
Discharge:               Date of Birth:  [birth year]  
         Report #: [id number]
                                                                     [id number]
_______________________________________________________________________________
THIS REPORT FOR:  //name//                      
 
 
--------------- APPROVED REPORT --------------
 
 
Patient Details
Patient Status: Out-Patient                  Room #: 
The patient is a 67 year-old female
 
Event Personnel
Hawk Maravilla  Cardiologist, Ava Chapa RN Circulator, Rangel Glez (R) Monitor, RileyGris RTR Scrub
 
Procedures Performed
Left heart catheterization, selective coronary artery, and 
percutaneous coronary intervention to the proximal circumflex and mid 
left anterior descending coronary arteries
 
Indication
Dyspnea, Unstable angina 
 
Risk Factors
Hypercholesterolemia, Hypertension
 
Previous Procedures/Diagnoses
Previous CHF
 
Admission/Lab Medications/Medications given during procedure
Angiomax bolus and infusion
 
Procedure Narrative
The patient was brought electively to the Cardiac Catheterization 
Laboratory and was prepped and draped in a sterile manner. The right 
femoral was infiltrated with 1% Lidocaine subcutaneous anesthesia. A 
[place] 6 FR sheath was inserted into the Right Femoral Artery. 
Coronary angiography was performed using coronary diagnostic 
catheters. The left coronary system was accessed and visualized with 
a Diagnostic JL 4 catheter. The left ventricle was accessed and 
visualized with a Diagnostic catheter. Left ventricular/Aortic Valve 
gradient assessed via catheter pullback. Pre-demployment femoral 
angiogram was performed . Closure device was deployed with a Fr 
MynxGrip 6/7F. The patient tolerated the procedure well and there 
were no complications associated with the procedure. There was no 
hematoma.
 
 
 
 
[place] [address]                    CARDIAC CATH REPORT           
_______________________________________________________________________________
 
Name:       NIGHAT HAMILTON                Room:           [address]#:  [id number]      Account #:      [id number]  
Admission:  02/14/18     Attend Phys:    Hawk Maravilla MD, 
Discharge:               Date of Birth:  [birth year]  
         Report #: [id number]
                                                                     [id number]
_______________________________________________________________________________
Intraoperative Conscious Sedation
Sedation start time:  13:07           Case end Time:  
14:12    
Fentanyl  25 mcg    Versed  1 mg  
 
Fluoro Time:    17.9 minutes    
Dose:     DAP 72383 cGycm2  976 mGy  
Contrast Type and Amount:  Visipaque 350 ml   
 
Diagnostic Cath
Left Main 0% narrowing
LAD  40% proximal calcified stenosis with 80% heavily calcified  mid 
vessel stenosis and 40% distal LAD narrowing
Circumflex Prominent though nondominant vessel with 90% heavily 
calcified proximal stenosis and 40% mid vessel narrowing
Right Coronary Dominant" artery with a recently defined 40% mid 
vessel narrowing
 
Left Ventriculography
Left Ventriculography was not performed.     
 
IVUS
Intravascular Ultrasound was performed on the proximal circumflex 
artery segment vessel.
 
IVUS Findings
NC Trek RX 2.75 X 12
 
Hemodynamics
The aortic pressure is 119/56 mmHg with a mean of 80 mmHg. The left 
ventricular pressure is 114/-3 mmHg with a mean of mmHg. The left 
ventricular end diastolic pressure is 11 mmHg. There was no gradient 
across the aortic valve upon pullback. 
 
PCI Technique Lesion
Anticoagulation was achieved with Angiomax. Patient was preloaded 
with Angiomax IV 6 ml. Percutaneous coronary intervention was 
performed on the proximal circumflex artery segment. The lesion 
stenosis prior to intervention was 90% with CHAD 3 flow. A 6FR XB 3.5 
100CM Guide Catheter was used to engage the ostium. A IG: BMW 190cm 
Interventional Guidewire was used to cross the lesion.
 
BALLOON DILATION
A Balloon catheter Trek RX 2.5 X 12 was inserted and inflated up to 
16.00atm for 23seconds.
 
 
 
 
[place] [address]                    CARDIAC CATH REPORT           
_______________________________________________________________________________
 
Name:       NIGHAT HAMILTON BRENDA                Room:           [address]#:  [id number]      Account #:      [id number]  
Admission:  02/14/18     Attend Phys:    Hawk Maravilla MD, 
Discharge:               Date of Birth:  [birth year]  
         Report #: [id number]
                                                                     [id number]
_______________________________________________________________________________
STENT DEPLOYMENT
A drug-eluting stent Xience Alpine RX 2.75X12 was inserted and 
inflated up to 18atm for 15seconds.
 
POST STENT DEPLOYMENT BALLOON DILATION
A Balloon catheter NC Trek RX 3.0 X 8 was inserted and inflated up to 
18atm for 10seconds.
 
Final angiography reveals 10 % stenosis with CHAD 3 flow.
 
PCI Technique Lesion
Percutaneous coronary intervention was performed on the proximal 
circumflex artery segment.
 
BALLOON DILATION
A Balloon catheter NC Trek RX 2.75 X 12 was inserted and inflated up 
to 24.00atm for 36seconds. Additional Inflation: 25.00atm for 
24seconds. Additional Inflation: 20.00atm for 19seconds.
 
STENT DEPLOYMENT
A drug-eluting stent Xience Alpine RX 2.75X12 was inserted and 
inflated up to 12.00atm for 14seconds. Additional Inflation: 15.00atm 
for 12seconds.
 
POST STENT DEPLOYMENT BALLOON DILATION
A Balloon catheter NC Trek RX 3.0 X 8 was inserted and inflated up to 
16.00atm for 15seconds. Additional Inflation: 18.00atm for 
14seconds.
 
PCI Technique Lesion 2
Percutaneous Coronary Intervention was performed on the mid left 
anterior descending artery segment. Patient was preloaded with 
Angiomax IV 6 ml. Percutaneous coronary intervention was performed on 
the mid left anterior descending artery segment. The lesion stenosis 
prior to intervention was 80% with CHAD 3 flow. A 6FR XB 3.5 100CM 
Guide Catheter was used to engage the ostium. A IG: BMW 190cm 
Interventional Guidewire was used to cross the lesion.
 
Balloon Dilation
A Balloon catheter Trek RX 2.25 X 12 was inserted and inflated up to 
10.00atm for 14seconds.
 
Stent Deployment
A drug-eluting stent Xience Alpine RX 2.25X12 was inserted and 
inflated up to 14atm for 15seconds.
 
 
 
 
[place] [address]                    CARDIAC CATH REPORT           
_______________________________________________________________________________
 
Name:       NIGHAT HAMILTON                Room:           [address] [place]  
[facility].#:  [id number]      Account #:      [id number]  
Admission:  02/14/18     Attend Phys:    Hawk Maravilla MD, 
Discharge:               Date of Birth:  [birth year]  
         Report #: [id number]
                                                                     [id number]
_______________________________________________________________________________
Post Stent Deployment Balloon Dilation
A Balloon catheter NC Trek RX 2.25 X 8 was inserted and inflated up 
to 18atm for 10seconds.
 
Final angiography reveals 10 % stenosis with CHAD 3 flow.
 
PCI Technique Lesion
Percutaneous coronary intervention was performed on the mid left 
anterior descending artery segment.
 
BALLOON DILATION
A Balloon catheter NC Trek RX 2.25x12 was inserted and inflated up to 
16.00atm for 15seconds. Additional Inflation: 18atm for 14seconds. 
Additional Inflation: 20atm for 10seconds.
 
STENT DEPLOYMENT
A drug-eluting stent Xience Alpine RX 2.25X12 was inserted and 
inflated up to 10atm for 12seconds. Additional Inflation: 12atm for 
10seconds.
 
POST STENT DEPLOYMENT BALLOON DILATION
A Balloon catheter NC Trek RX 2.25 X 8 was inserted and inflated up 
to 18atm for 12seconds. Additional Inflation: 20atm for 13seconds. 
Additional Inflation: 21atm for 11seconds.
 
Conclusion
#1 significant coronary artery disease characterized by following:
 
A 40% calcified proximal 80% calcified mid and 40% distal LAD 
narrowing,
 
B prominent though nondominant circumflex with 90% heavily calcified 
proximal stenosis and 40% mid vessel narrowing,
 
C dominant right coronary artery with 40% mid vessel narrowing
 
#2 normal left-sided hemodynamics study,
 
#3 successful percutaneous coronary intervention deployment of 
drug-eluting stent at site of 90% heavily calcified proximal 
circumflex stenosis with 10% residual
stenosis following stent deployment and CHAD-3 flow the distal 
vessel,
 
#4 successful percutaneous coronary intervention deploying 
drug-eluting stent at site of 80% calcified mid LAD stenosis with 10% 
 
 
 
[address]  [address]                    CARDIAC CATH REPORT           
_______________________________________________________________________________
 
Name:       NIGHAT HAMILTON                Room:           [address] [place]  
M.R.#:  [id number]      Account #:      [id number]  
Admission:  02/14/18     Attend Phys:    Hawk Maravilla MD, 
Discharge:               Date of Birth:  [birth year]  
         Report #: [id number]
                                                                     [id number]
_______________________________________________________________________________
residual involving stent deployment and CHAD-3 flow the distal vessel 
 
Recommendations
Cardiac Rehabilitation Referral
Medical Therapy
 
Medications Administered
Aspirin (any) 
Ticagrelor
 
 
 
 
 
 
 
 
 
 
 
 
 
 
 
 
 
 
 
 
 
 
 
 
 
 
 
 
 
 
 
 
 
 
 
<ELECTRONICALLY SIGNED>
                                        By:  Hawk Maravilla MD, FACC     
02/14/18 1651
D: 02/14/18 1651_______________________________________
T: 02/14/18 1651Hawk Maravilla MD, FACC        /INF

## 2018-02-15 VITALS — DIASTOLIC BLOOD PRESSURE: 76 MMHG | SYSTOLIC BLOOD PRESSURE: 125 MMHG

## 2018-02-15 VITALS — SYSTOLIC BLOOD PRESSURE: 125 MMHG | DIASTOLIC BLOOD PRESSURE: 76 MMHG

## 2018-02-15 VITALS — SYSTOLIC BLOOD PRESSURE: 105 MMHG | DIASTOLIC BLOOD PRESSURE: 70 MMHG

## 2018-02-15 VITALS — DIASTOLIC BLOOD PRESSURE: 73 MMHG | SYSTOLIC BLOOD PRESSURE: 110 MMHG

## 2018-02-15 LAB
ANION GAP SERPL CALC-SCNC: 7 MMOL/L (ref 7–16)
BUN SERPL-MCNC: 15 MG/DL (ref 7–18)
CALCIUM SERPL-MCNC: 7.5 MG/DL (ref 8.5–10.1)
CHLORIDE SERPL-SCNC: 106 MMOL/L (ref 98–107)
CO2 SERPL-SCNC: 26 MMOL/L (ref 21–32)
CREAT SERPL-MCNC: 0.5 MG/DL (ref 0.6–1.3)
GLUCOSE SERPL-MCNC: 67 MG/DL (ref 70–99)
HCT VFR BLD CALC: 38.2 % (ref 37–47)
HGB BLD-MCNC: 12.8 GM/DL (ref 12–15)
MCH RBC QN AUTO: 32.7 PG (ref 26–34)
MCHC RBC AUTO-ENTMCNC: 33.6 G/DL (ref 28–37)
MCV RBC: 97.3 FL (ref 80–100)
MPV: 7.6 FL. (ref 7.2–11.1)
PLATELET COUNT*: 334 THOU/UL (ref 150–400)
POTASSIUM SERPL-SCNC: 4.1 MMOL/L (ref 3.5–5.1)
RBC # BLD AUTO: 3.93 MIL/UL (ref 4.2–5)
RDW-CV: 12.7 % (ref 10.5–14.5)
SODIUM SERPL-SCNC: 139 MMOL/L (ref 136–145)
TROPONIN-I LEVEL: 1.62 NG/ML (ref ?–0.06)
WBC # BLD AUTO: 15.5 THOU/UL (ref 4–11)

## 2018-02-15 NOTE — EKG
[place] [address]
Phone:  [phone]                     ELECTROCARDIOGRAM REPORT      
_______________________________________________________________________________
 
Name:       NIGHAT HAMILTON                Room:           [address]#:  [id number]      Account #:      [id number]  
Admission:  18     Attend Phys:    Hawk Maravilla MD, 
Discharge:  02/15/18     Date of Birth:  [birth year]  
         Report #: [id number]
    [id number]
_______________________________________________________________________________
THIS REPORT FOR:  //name//                      
 
                          [facility]
                                       
Test Date:    2018-02-15               Test Time:    08:18:45
Pat Name:     NIGHAT HAMILTON            Department:   
Patient ID:   [id number]            Room:         [facility]
Gender:       F                        Technician:   
:          1950               Requested By: Hawk Maravilla
Order Number: [id number]    Camron MD:   Savage Staerns
                                 Measurements
Intervals                              Axis          
Rate:         77                       P:            68
ME:           162                      QRS:          -82
QRSD:         128                      T:            95
QT:           425                                    
QTc:          482                                    
                           Interpretive Statements
Sinus rhythm
Left bundle branch block
Compared to ECG 2018 14:57:59
No significant changes
 
Electronically Signed On 2- 17:16:57 CST by Savage Stearns
[email or web address]
 
 
 
 
 
 
 
 
 
 
 
 
 
 
 
 
 
 
  <ELECTRONICALLY SIGNED>
                                           By: Savage Stearns MD, [facility]   
  02/15/18     1716
D: 02/15/18 0818   _____________________________________
T: 02/15/18 0818   Savage Stearns MD, [facility]     /EPI

## 2018-02-15 NOTE — NUR
I ASSUMED CARE OF THE PATIENT AT 0700.  SHE IS ALERT AND ORIENTED X4 AND IS UP
WITH 1 ASSIST.  BED IS IN THE LOW LOCKED POSITION AND CALL LIGHT IS IN REACH.
HOURLY ROUNDING WAS COMPLETED AND PATIENT NEEDS WERE MET.  PAIN IS DENIED.  IV
WAS D/C'D INTACT AND HOMEOSTASIS WAS ACHEIVED.  RIGHT GROIN DRESSING IS C/D/I
WITH A SMALL SHADOW FROM STENT PLACEMENT.  PULSES ARE EQUAL AND STRONG.
PATIENT WAS DISCHARGED TO HOME WITH FRIEND VIA WHEELCHAIR WITH SCRIPTS.
DISCHARGE WAS UNDERSTOOD.  FOLLOWUP APPOINTMENTS WERE LISTED AND DISCUSSED.
[facility]ER WAS DISCHARGED AND PATIENT WAS ABLE TO VOID.

## 2018-02-16 NOTE — D
[address]  [address]                    DISCHARGE SUMMARY             
_______________________________________________________________________________
 
Name:       LOWCO,NIGHAT BRENDA                Room:           [address] [place] SIEGEL#:  [id number]      Account #:      [id number]  
Admission:  02/14/18     Attend Phys:    Hawk Maravilla MD, 
Discharge:  02/15/18     Date of Birth:  [birth year]  
         Report #: [id number]
                                                                     [id number]  
_______________________________________________________________________________
THIS REPORT FOR:  //name//                      
 
CC: JOSHUA Maravilla
 
DATE OF SERVICE:  02/15/2018
 
 
FINAL DISCHARGE DIAGNOSES:
1.  Unstable angina.
2.  Coronary artery disease.
3.  Status post percutaneous coronary intervention of the proximal circumflex
and mid left anterior descending.
4.  Mixed ischemic and nonischemic cardiomyopathy.
5.  Hypertension.
6.  Chronic obstructive pulmonary disease.
7.  Tobacco abuse.
 
PROCEDURES:  02/14/2018-left heart catheterization, selective coronary
arteriography and percutaneous coronary intervention with deployment of
drug-eluting stents in the proximal circumflex and mid LAD.
 
The patient is a very pleasant 67-year-old female with a history of a
cardiomyopathy and significant coronary artery disease.  She presented with
increasing dyspnea and mild chest discomfort.  She underwent recatheterization
on 02/14, which revealed 90% eccentric heavily calcified proximal circumflex
stenosis with 40% proximal and 80% mid LAD stenosis.  After significant lesion
preparation, I deployed one 2.75 x 12 mm Xience Alpine drug-eluting stent in the
proximal circumflex and one 2.25 x 12 mm Xience Alpine in the mid LAD with good
angiographic result and 10% residual narrowing at both sites following stent
deployment with CHAD 3 flow of the distal vessel.
 
She did well postprocedurally.  There was good hemostasis at the right femoral
site of catheterization.  Laboratory data on 02/15, revealed sodium 139,
potassium 4.1, BUN 15, and creatinine 0.5.  Hemoglobin 12.8, white blood cell
count 15,500 with 334,000 platelets.  She ambulated in the hallways without
difficulty.
 
She was discharged to home in stable condition on the following medications: 
Aspirin 81 mg daily, carvedilol 3.125 mg b.i.d., cephalexin 500 mg b.i.d. for 7
days, furosemide 20 mg daily, DuoNeb q.i.d., losartan 12.5 mg daily, nicotine
patch 14 mg transdermally daily, prednisone 10 mg daily, spironolactone 25 mg
daily, ticagrelor 90 mg b.i.d. with 180 mg loading dose, and p.r.n. sublingual
nitroglycerin.
 
The patient is scheduled to return to see my nurse practitioner Beata Ward on
 
 
 
[place] [address]                    DISCHARGE SUMMARY             
_______________________________________________________________________________
 
Name:       NIGHAT HAMILTON BRENDA                Room:           [address] [place] SIEGEL#:  [id number]      Account #:      [id number]  
Admission:  02/14/18     Attend Phys:    Hawk Maravilla MD, 
Discharge:  02/15/18     Date of Birth:  [birth year]  
         Report #: [id number]
                                                                     [id number]  
_______________________________________________________________________________
02/21/2018, at 1000 hours and myself on 04/17/2018, at 1300.
 
The importance of cigarette smoking cessation was emphasized.
 
The patient is discharged to home in stable condition on the aforementioned
medications with followup as iterated above.
 
 
 
 
 
 
 
 
 
 
 
 
 
 
 
 
 
 
 
 
 
 
 
 
 
 
 
 
 
 
 
 
 
 
 
 
 
 
<ELECTRONICALLY SIGNED>
                                        By:  Hawk Maravilla MD, FACC     
02/16/18     1049
D: 02/15/18 0929_______________________________________
T: 02/15/18 1049Hawk Maravilla MD, FACC        /nt

## 2018-03-08 ENCOUNTER — HOSPITAL ENCOUNTER (OUTPATIENT)
Dept: HOSPITAL 96 - M.ULTRA | Age: 68
End: 2018-03-08
Attending: INTERNAL MEDICINE
Payer: COMMERCIAL

## 2018-03-08 DIAGNOSIS — I10: ICD-10-CM

## 2018-03-08 DIAGNOSIS — I25.10: Primary | ICD-10-CM

## 2018-03-08 DIAGNOSIS — I70.90: ICD-10-CM

## 2018-03-08 LAB
ANION GAP SERPL CALC-SCNC: 5 MMOL/L (ref 7–16)
BUN SERPL-MCNC: 9 MG/DL (ref 7–18)
CALCIUM SERPL-MCNC: 9.1 MG/DL (ref 8.5–10.1)
CHLORIDE SERPL-SCNC: 101 MMOL/L (ref 98–107)
CO2 SERPL-SCNC: 34 MMOL/L (ref 21–32)
CREAT SERPL-MCNC: 0.6 MG/DL (ref 0.6–1.3)
GLUCOSE SERPL-MCNC: 86 MG/DL (ref 70–99)
POTASSIUM SERPL-SCNC: 3.3 MMOL/L (ref 3.5–5.1)
SODIUM SERPL-SCNC: 140 MMOL/L (ref 136–145)

## 2018-03-23 ENCOUNTER — HOSPITAL ENCOUNTER (OUTPATIENT)
Dept: HOSPITAL 96 - M.LAB | Age: 68
End: 2018-03-23
Attending: NURSE PRACTITIONER
Payer: COMMERCIAL

## 2018-03-23 DIAGNOSIS — I25.5: Primary | ICD-10-CM

## 2018-03-23 LAB
ANION GAP SERPL CALC-SCNC: 8 MMOL/L (ref 7–16)
BUN SERPL-MCNC: 11 MG/DL (ref 7–18)
CALCIUM SERPL-MCNC: 9.2 MG/DL (ref 8.5–10.1)
CHLORIDE SERPL-SCNC: 101 MMOL/L (ref 98–107)
CO2 SERPL-SCNC: 32 MMOL/L (ref 21–32)
CREAT SERPL-MCNC: 0.6 MG/DL (ref 0.6–1.3)
GLUCOSE SERPL-MCNC: 65 MG/DL (ref 70–99)
POTASSIUM SERPL-SCNC: 3.8 MMOL/L (ref 3.5–5.1)
SODIUM SERPL-SCNC: 141 MMOL/L (ref 136–145)

## 2019-07-03 ENCOUNTER — HOSPITAL ENCOUNTER (INPATIENT)
Dept: HOSPITAL 96 - M.ERS | Age: 69
LOS: 5 days | Discharge: HOME HEALTH | DRG: 543 | End: 2019-07-08
Attending: INTERNAL MEDICINE | Admitting: INTERNAL MEDICINE
Payer: MEDICARE

## 2019-07-03 VITALS — DIASTOLIC BLOOD PRESSURE: 80 MMHG | SYSTOLIC BLOOD PRESSURE: 134 MMHG

## 2019-07-03 VITALS — DIASTOLIC BLOOD PRESSURE: 66 MMHG | SYSTOLIC BLOOD PRESSURE: 118 MMHG

## 2019-07-03 VITALS — WEIGHT: 103 LBS | HEIGHT: 59.02 IN | BODY MASS INDEX: 20.76 KG/M2

## 2019-07-03 DIAGNOSIS — M19.90: ICD-10-CM
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
## 2019-07-03 DIAGNOSIS — Z86.12: ICD-10-CM
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
## 2019-07-03 DIAGNOSIS — I10: ICD-10-CM

## 2019-07-03 DIAGNOSIS — Z95.5: ICD-10-CM

## 2019-07-03 DIAGNOSIS — I25.2: ICD-10-CM

## 2019-07-03 DIAGNOSIS — J98.11: ICD-10-CM

## 2019-07-03 DIAGNOSIS — M80.051A: Primary | ICD-10-CM

## 2019-07-03 DIAGNOSIS — Z79.82: ICD-10-CM

## 2019-07-03 DIAGNOSIS — Z79.899: ICD-10-CM

## 2019-07-03 DIAGNOSIS — Z87.891: ICD-10-CM

## 2019-07-03 DIAGNOSIS — J44.9: ICD-10-CM
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
## 2019-07-04 VITALS — SYSTOLIC BLOOD PRESSURE: 127 MMHG | DIASTOLIC BLOOD PRESSURE: 78 MMHG

## 2019-07-04 VITALS — DIASTOLIC BLOOD PRESSURE: 73 MMHG | SYSTOLIC BLOOD PRESSURE: 120 MMHG

## 2019-07-04 VITALS — SYSTOLIC BLOOD PRESSURE: 121 MMHG | DIASTOLIC BLOOD PRESSURE: 66 MMHG

## 2019-07-04 LAB
ABSOLUTE BASOPHILS: 0.1 THOU/UL (ref 0–0.2)
ABSOLUTE EOSINOPHILS: 0 THOU/UL (ref 0–0.7)
ABSOLUTE MONOCYTES: 0.6 THOU/UL (ref 0–1.2)
ANION GAP SERPL CALC-SCNC: 7 MMOL/L (ref 7–16)
BASOPHILS NFR BLD AUTO: 0.6 %
BUN SERPL-MCNC: 9 MG/DL (ref 7–18)
CALCIUM SERPL-MCNC: 8.6 MG/DL (ref 8.5–10.1)
CHLORIDE SERPL-SCNC: 103 MMOL/L (ref 98–107)
CO2 SERPL-SCNC: 29 MMOL/L (ref 21–32)
CREAT SERPL-MCNC: 0.8 MG/DL (ref 0.6–1.3)
EOSINOPHIL NFR BLD: 0.3 %
GLUCOSE SERPL-MCNC: 126 MG/DL (ref 70–99)
GRANULOCYTES NFR BLD MANUAL: 82.3 %
HCT VFR BLD CALC: 44 % (ref 37–47)
HGB BLD-MCNC: 15.7 GM/DL (ref 12–15)
LYMPHOCYTES # BLD: 1.1 THOU/UL (ref 0.8–5.3)
LYMPHOCYTES NFR BLD AUTO: 10.7 %
MAGNESIUM SERPL-MCNC: 2 MG/DL (ref 1.8–2.4)
MCH RBC QN AUTO: 33.8 PG (ref 26–34)
MCHC RBC AUTO-ENTMCNC: 35.7 G/DL (ref 28–37)
MCV RBC: 94.7 FL (ref 80–100)
MONOCYTES NFR BLD: 6.1 %
MPV: 8 FL. (ref 7.2–11.1)
NEUTROPHILS # BLD: 8.4 THOU/UL (ref 1.6–8.1)
NUCLEATED RBCS: 0 /100WBC
PLATELET COUNT*: 284 THOU/UL (ref 150–400)
POTASSIUM SERPL-SCNC: 3.6 MMOL/L (ref 3.5–5.1)
RBC # BLD AUTO: 4.65 MIL/UL (ref 4.2–5)
RDW-CV: 12.4 % (ref 10.5–14.5)
SODIUM SERPL-SCNC: 139 MMOL/L (ref 136–145)
WBC # BLD AUTO: 10.2 THOU/UL (ref 4–11)

## 2019-07-04 NOTE — NUR
PT CAME DOWN TO THE UNIT AT 2220. ADMISSION HISTORY/ASSESSMENT COMPLETED.
ALERT AND ORIENTED. VITALS STABLE WITH 2L O2 BY NC. PT STATED SHE ONLY HAS
PAIN WHEN SHE MOVES. NO NAUSEA OR VOIMITING. HOURLY ROUNDING COMPLETED. WILL
CONTINUE TO MONITOR.

## 2019-07-04 NOTE — NUR
PATIENT PLEASANT AND COOPERATIVE THRU SHIFT. PATIENT DENIES NEED FOR PAIN
INTERVENTION/MEDICATION. PATIENT STATES SOME PAIN W/ TRANSFERS/AMBULATION.
EATING WELL. PHYSICAL THERAPY IN TO SEE PATIENT DURING SHIFT. PATIENT UP TO
CHAIR THIS AFTERNOON. ~MAYRARN

## 2019-07-05 VITALS — SYSTOLIC BLOOD PRESSURE: 105 MMHG | DIASTOLIC BLOOD PRESSURE: 69 MMHG

## 2019-07-05 VITALS — DIASTOLIC BLOOD PRESSURE: 82 MMHG | SYSTOLIC BLOOD PRESSURE: 125 MMHG

## 2019-07-05 VITALS — DIASTOLIC BLOOD PRESSURE: 60 MMHG | SYSTOLIC BLOOD PRESSURE: 113 MMHG

## 2019-07-05 NOTE — NUR
cm completed initial assessment to discuss d/c planning. pt a&ox4. pt states
she lives alone but has family suport, also neighbor is very helpful, "almost
bossy." pt states she is "very" independent and she drives. 0 DMEs. pt doesnt
have hx w/snf, used [facility] in the past, but doesnt recall which company. no
anticipated needs at this time. cm to remain available to assist as needed.

## 2019-07-05 NOTE — NUR
PATIENT ALERT AND ORIENTED X 4 BUT FORGETFUL. DENIED NEED FOR PAIN
MEEDICATION. POSITIONED CHANGE ENCOURAGED. VITAL SIGNS STABLE. DID NOT GET UP
THIS SHIFT. VOID PER BEDPAN. O2 ON AT 2L/MIN. SCD'S IN PLACE WITH HEELS
ELEVATED UP OFF OF BED. BED ALARM UTILIZED FOR SAFETY WITH HOURLY ROUNDS.
CONTINUE TO MONITOR.

## 2019-07-05 NOTE — NUR
PT A&Ox4. VITALS STABLE. IV PATENT. UP WITH 1 TO CAMMODE. PAIN TOLERATED.
DENIED NAUSEA. ON 2LOX. WORKED WELL WITH THERAPY. UP TO CHAIR FOR MEALS. FALL
PRECAUTIONS IN PLACE. CALL LIGHT WITHIN REACH. WILL CONTINUE TO MONITOR.

## 2019-07-06 VITALS — DIASTOLIC BLOOD PRESSURE: 66 MMHG | SYSTOLIC BLOOD PRESSURE: 106 MMHG

## 2019-07-06 VITALS — SYSTOLIC BLOOD PRESSURE: 116 MMHG | DIASTOLIC BLOOD PRESSURE: 73 MMHG

## 2019-07-06 NOTE — NUR
ASSUMED CARE OF PATIENT AT APPROX 0730. ALERT AND OREINTED X4. ASSESSMENT
COMPLETED AND AS CHARTED. VSS ON 2 LITERS 02. PAIN MANAGED WITH ORAL
MEDICATION. NO COMPLAINTS OF NAUSEA OR SOA. PATIENT WORKED WITH THERAPIES AND
PROGRESSED TOWARD GOALS. PATIENT UP WITH GAIT BELT AND WALKER. FALL
PRECAUTIONS IN PLACE. CALL LIGHT WITHIN REACH. HOURLY ROUNDS COMPLETED. WILL
CONTINUE TO MONITOR.

## 2019-07-07 VITALS — SYSTOLIC BLOOD PRESSURE: 112 MMHG | DIASTOLIC BLOOD PRESSURE: 54 MMHG

## 2019-07-07 VITALS — SYSTOLIC BLOOD PRESSURE: 140 MMHG | DIASTOLIC BLOOD PRESSURE: 76 MMHG

## 2019-07-07 NOTE — NUR
PATIENT HAS REMAINED ALERT AND ORIENTED X 4 THROUGHOUT THE SHIFT AND RESTING
QUIETLY ON HOURLY ROUNDS. HAS DENIED NEED FOR PAIN MEDICATION OVERNIGHT. UP
WITH ASSIST OF ONE, GAIT BELT AND WALKER. SOME IMPROVEMENT WITH MOBILITY
SKILLS. VITAL SIGNS STABLE. BED ALARM ON FOR SAFETY. CONTINUE TO MONITOR.

## 2019-07-07 NOTE — NUR
ASSUMED CARE OF PATIENT AT APPROX 0730. ALERT AND ORIENTED X4. ASSESSMENT
COMPLETED AND AS CHARTED. VSS ON ROOM AIR. PAIN MINIMAL AND MANAGED WITH ORAL
MEDICATIONS FOR THERAPIES. NO COMPLAINTS OF NAUSEA, OR SOA. PATIENT UP AND
WALKING WITH WALKER AND GAIT BELT, PROGRESSING TOWARD GOALS. WALKING TO THE
BATHROOM FOR VOIDING AND BOWEL MOVEMENT TODAY. REST AND EXERCISE COMPLETED
THIS AFTERNOON, PATIENT STABLE ON ROOM AIR. FALL PRECAUTIONS IN PLACE. CALL
LIGHT WITHIN REACH AND USES APPROPRIATELY. HOURLY ROUNDS COMPLETED. WILL
CONTINUE TO MONITOR.

## 2019-07-08 VITALS — SYSTOLIC BLOOD PRESSURE: 113 MMHG | DIASTOLIC BLOOD PRESSURE: 74 MMHG

## 2019-07-08 NOTE — NUR
PATIENT HAS REMAINED ALERT AND ORIENTED X 4 THROUGHOUT THE SHIFT AND RESTING
QUIETLY ON HOURLY ROUNDS. UP TO BR TO VOID. AMBULATED 100+ FEET IN HALLWAY
BEFORE HS. SOME SHORTNESS OF AIR AND FATIGUE TOWARDS END OF WALK BUT RECOVERED
WELL ONCE BACK TO BED. MEDICATED X 1 FOR PAIN. VITAL SIGNS STABLE ON RTOOM
AIR. CONTINUE TO MONITOR.

## 2019-07-08 NOTE — NUR
PATIENT DISCHARGED TO HOME WITH HOME HEALTH WALKER AND GAIT BELT. IV DC'D.
PATIENT GIVEN PRN HYDROCODONE FOR PAIN. VERBALIZES UNDERSTANDING OF PAPERWORK
AND SCRIPTS. PATIENT TAKEN OUT VIA WHEELCHAIR WITH ALL BELONGINGS.

## 2019-07-08 NOTE — NUR
Pt to d/c home with [facility]. Pt provider walker from [facility], PT to
dispense from closet. CM to remain available to assist if needed.
